# Patient Record
Sex: MALE | Race: WHITE | NOT HISPANIC OR LATINO | Employment: FULL TIME | URBAN - METROPOLITAN AREA
[De-identification: names, ages, dates, MRNs, and addresses within clinical notes are randomized per-mention and may not be internally consistent; named-entity substitution may affect disease eponyms.]

---

## 2018-02-15 ENCOUNTER — TELEPHONE (OUTPATIENT)
Dept: FAMILY MEDICINE | Facility: CLINIC | Age: 53
End: 2018-02-15

## 2018-02-15 NOTE — LETTER
[facility] [address] [place] [address] [phone]      February 15, 2018      Kelvin Guevara  [address]  [facility] [address]          Dear Kelvin,    ACTION REQUIRED    You and I are failing at something critical, screening you for colon cancer!    Colon Cancer Screening- Recommended every 5-10 years, depending on your history, in order to prevent and detect colon cancer at its earliest stages.  Colon cancer is now the second leading cause of death in the [place] for both men and women and there are over 130,000 new cases and 50,000 deaths per year from colon cancer.  Colonoscopies can prevent 90-95% of these deaths.  Problem lesions can be removed before they ever become cancer.  This test is not only looking for cancer, but also getting rid of precancerious lesions.  You are usually given some sedation which makes the test very comfortable for most people.      If you do not wish to do a colonoscopy or cannot afford to do one, and you have no family members with colon cancer and you have had no previous polyps, at this time, there is another option. It is called a Cologuard test (take home stool sample kit).  It does need to be repeated every three years and if a positive result is obtained, you would be referred for a colonoscopy. The FIT test is really easy to do and does not require any  diet or medication restrictions and involves only one collection sample.      If you have completed either one of these tests or had a flexible sigmoidoscopy in the past five years at another facility, please have the records sent to our clinic so that we can best coordinate your care.  Please call us ([phone]) if you have questions or would like arrange either to do a colonoscopy or obtain the necessary test kit for the Cologuard test    Please contact our office to discuss your colon cancer screening options at your next physical exam.    Thanks!    Elvis  Zhou

## 2018-05-31 ENCOUNTER — OFFICE VISIT (OUTPATIENT)
Dept: FAMILY MEDICINE | Facility: CLINIC | Age: 53
End: 2018-05-31

## 2018-05-31 VITALS
DIASTOLIC BLOOD PRESSURE: 84 MMHG | HEIGHT: 63 IN | RESPIRATION RATE: 16 BRPM | OXYGEN SATURATION: 97 % | BODY MASS INDEX: 28.53 KG/M2 | HEART RATE: 67 BPM | TEMPERATURE: 98.1 F | SYSTOLIC BLOOD PRESSURE: 128 MMHG | WEIGHT: 161 LBS

## 2018-05-31 DIAGNOSIS — Z12.5 SCREENING FOR PROSTATE CANCER: ICD-10-CM

## 2018-05-31 DIAGNOSIS — R17 ELEVATED BILIRUBIN: ICD-10-CM

## 2018-05-31 DIAGNOSIS — K64.9 HEMORRHOIDS, UNSPECIFIED HEMORRHOID TYPE: ICD-10-CM

## 2018-05-31 DIAGNOSIS — Z00.00 ROUTINE GENERAL MEDICAL EXAMINATION AT A HEALTH CARE FACILITY: Primary | ICD-10-CM

## 2018-05-31 DIAGNOSIS — Z13.1 SCREENING FOR DIABETES MELLITUS: ICD-10-CM

## 2018-05-31 DIAGNOSIS — Z12.11 SCREEN FOR COLON CANCER: ICD-10-CM

## 2018-05-31 DIAGNOSIS — E78.2 MIXED HYPERLIPIDEMIA: ICD-10-CM

## 2018-05-31 DIAGNOSIS — E83.52 HYPERCALCEMIA: ICD-10-CM

## 2018-05-31 DIAGNOSIS — Z11.4 SCREENING FOR HUMAN IMMUNODEFICIENCY VIRUS: ICD-10-CM

## 2018-05-31 PROBLEM — K64.8 OTHER HEMORRHOIDS: Status: ACTIVE | Noted: 2018-05-31

## 2018-05-31 LAB
% GRANULOCYTES: 71.1 % (ref 42.2–75.2)
HCT VFR BLD AUTO: 44.3 % (ref 39–51)
HEMOGLOBIN: 14.6 G/DL (ref 13.4–17.5)
LYMPHOCYTES NFR BLD AUTO: 22 % (ref 20.5–51.1)
MCH RBC QN AUTO: 29.9 PG (ref 27–31)
MCHC RBC AUTO-ENTMCNC: 32.9 G/DL (ref 33–37)
MCV RBC AUTO: 90.8 FL (ref 80–100)
MONOCYTES NFR BLD AUTO: 6.9 % (ref 1.7–9.3)
PLATELET # BLD AUTO: 237 K/UL (ref 140–450)
RBC # BLD AUTO: 4.87 X10/CMM (ref 4.2–5.9)
WBC # BLD AUTO: 5.5 X10/CMM (ref 3.8–11)

## 2018-05-31 PROCEDURE — 99396 PREV VISIT EST AGE 40-64: CPT | Performed by: FAMILY MEDICINE

## 2018-05-31 PROCEDURE — 80053 COMPREHEN METABOLIC PANEL: CPT | Mod: 90 | Performed by: FAMILY MEDICINE

## 2018-05-31 PROCEDURE — 80061 LIPID PANEL: CPT | Mod: 90 | Performed by: FAMILY MEDICINE

## 2018-05-31 PROCEDURE — 84153 ASSAY OF PSA TOTAL: CPT | Mod: 90 | Performed by: FAMILY MEDICINE

## 2018-05-31 PROCEDURE — 85025 COMPLETE CBC W/AUTO DIFF WBC: CPT | Performed by: FAMILY MEDICINE

## 2018-05-31 PROCEDURE — 36415 COLL VENOUS BLD VENIPUNCTURE: CPT | Performed by: FAMILY MEDICINE

## 2018-05-31 NOTE — MR AVS SNAPSHOT
After Visit Summary   5/31/2018    Kelvin Guevara    MRN: [id number]           Patient Information     Date Of Birth          1965        Visit Information        Provider Department      5/31/2018 8:30 AM Kelly Grady MD [facility]        Today's Diagnoses     Routine general medical examination at a health care facility    -  1    Mixed hyperlipidemia        Hypercalcemia        Hemorrhoids, unspecified hemorrhoid type        Elevated bilirubin        Screening for diabetes mellitus        Screening for human immunodeficiency virus        Screen for colon cancer        Screening for prostate cancer          Care Instructions      Preventive Health Recommendations  Male Ages 50 - 64    Yearly exam:             See your health care provider every year in order to  o   Review health changes.   o   Discuss preventive care.    o   Review your medicines if your doctor has prescribed any.     Have a cholesterol test every 5 years, or more frequently if you are at risk for high cholesterol/heart disease.     Have a diabetes test (fasting glucose) every three years. If you are at risk for diabetes, you should have this test more often.     Have a colonoscopy at age 50, or have a yearly FIT test (stool test). These exams will check for colon cancer.      Talk with your health care provider about whether or not a prostate cancer screening test (PSA) is right for you.    You should be tested each year for STDs (sexually transmitted diseases), if you re at risk.     Shots: Get a flu shot each year. Get a tetanus shot every 10 years.     Nutrition:    Eat at least 5 servings of fruits and vegetables daily.     Eat whole-grain bread, whole-wheat pasta and brown rice instead of white grains and rice.     Talk to your provider about Calcium and Vitamin D.     Lifestyle    Exercise for at least 150 minutes a week (30 minutes a day, 5 days a week). This will help you control your weight  and prevent disease.     Limit alcohol to one drink per day.     No smoking.     Wear sunscreen to prevent skin cancer.     See your dentist every six months for an exam and cleaning.     See your eye doctor every 1 to 2 years.    OK to try generic Zantac (ranitidine) 150 mg up to twice a day if needed for reflux symptoms.  If not helpful, we can consider imaging to look for a hiatal hernia (stomach slides up into the chest).     For your cough, try Zantac twice a day for 1 week - see if it makes a difference.     Please check with your insurance company to verify you have coverage benefits for the two stage shingles vaccination series (Shingrix). Shingles can be a very painful disease and the medications we have are generally not entirely effective at controlling shingles related pain. The vaccination is intended to prevent shingles and to help reduce the risk of having long-term pain if you do contract shingles.     Please check with your insurance company for coverage benefits for Hepatitis A and Hepatitis B vaccination. These vaccinations protect your liver from some forms of hepatitis. Hep A is usually given in 2 vaccinations several weeks apart. Hep B is given in 3 vaccinations over several months.     Consider Ortho for your wrist. Will defer xray today to the Ortho provider.           Follow-ups after your visit        Additional Services     COLORECTAL SURGERY REFERRAL       Your provider has referred you to: N: [facility] Cede[facility] [phone]   [email or web address]    Referral Reason(s): Hemorrhoids  Special Concerns: None  This referral is: Elective (week +)  It is OK to leave a message on patient's voicemail.    Please be aware that coverage of these services is subject to the terms and limitations of your health insurance plan.  Call member services at your health plan with any benefit or coverage questions.      Please bring the following with you to your  appointment:    (1) Any X-Rays, CTs or MRIs which have been performed.  Contact the facility where they were done to arrange for  prior to your scheduled appointment.    (2) List of current medications  (3) This referral request   (4) Any documents/labs given to you for this referral            GASTROENTEROLOGY ADULT REF CONSULT ONLY       Preferred Location: [facility] [phone]  Screening colonoscopy    Please be aware that coverage of these services is subject to the terms and limitations of your health insurance plan.  Call member services at your health plan with any benefit or coverage questions.  Any procedures must be performed at a [facility] facility OR coordinated by your clinic's referral office.    Please bring the following with you to your appointment:    (1) Any X-Rays, CTs or MRIs which have been performed.  Contact the facility where they were done to arrange for  prior to your scheduled appointment.    (2) List of current medications   (3) This referral request   (4) Any documents/labs given to you for this referral            GASTROENTEROLOGY ADULT REF PROCEDURE ONLY       Last Lab Result: Creatinine (mg/dL)       Date                     Value                 06/07/2016               1.05             ----------  There is no height or weight on file to calculate BMI.     Needed:  No  Language:  English    Patient will be contacted to schedule procedure.     Please be aware that coverage of these services is subject to the terms and limitations of your health insurance plan.  Call member services at your health plan with any benefit or coverage questions.  Any procedures must be performed at a [facility] facility OR coordinated by your clinic's referral office.    Please bring the following with you to your appointment:    (1) Any X-Rays, CTs or MRIs which have been performed.  Contact the facility where they were done to arrange for  prior to your scheduled  "appointment.    (2) List of current medications   (3) This referral request   (4) Any documents/labs given to you for this referral                  Who to contact     If you have questions or need follow up information about today's clinic visit or your schedule please contact [facility] directly at [phone].  Normal or non-critical lab and imaging results will be communicated to you by MyChart, letter or phone within 4 business days after the clinic has received the results. If you do not hear from us within 7 days, please contact the clinic through [employer] or phone. If you have a critical or abnormal lab result, we will notify you by phone as soon as possible.  Submit refill requests through [employer] or call your pharmacy and they will forward the refill request to us. Please allow 3 business days for your refill to be completed.          Additional Information About Your Visit        MyChart Information     [employer] lets you send messages to your doctor, view your test results, renew your prescriptions, schedule appointments and more. To sign up, go to [email or web address] . Click on \"Log in\" on the left side of the screen, which will take you to the Welcome page. Then click on \"Sign up Now\" on the right side of the page.     You will be asked to enter the access code listed below, as well as some personal information. Please follow the directions to create your username and password.     Your access code is: [email or web address]  Expires: 2018  9:30 AM     Your access code will  in 90 days. If you need help or a new code, please call your [facility] clinic or [phone].        Care EveryWhere ID     This is your Care EveryWhere ID. This could be used by other organizations to access your [facility] medical records  [id number]        Your Vitals Were     Pulse Temperature Respirations Height Pulse Oximetry BMI (Body Mass Index)    67 98.1  F (36.7  C) (Oral) 16 1.6 m (5' 3\") 97% 28.52 kg/m2 "       Blood Pressure from Last 3 Encounters:   05/31/18 128/84   06/07/16 124/80   04/17/16 (!) 150/98    Weight from Last 3 Encounters:   05/31/18 73 kg (161 lb)   06/07/16 72.1 kg (159 lb)   04/17/16 74.8 kg (165 lb)              We Performed the Following     CBC with Diff/Plt ([facility])     COLORECTAL SURGERY REFERRAL     Comp. Metabolic Panel (14) ([facility])     GASTROENTEROLOGY ADULT REF CONSULT ONLY     GASTROENTEROLOGY ADULT REF PROCEDURE ONLY     HIV 1/0/2 Rflx ([facility])     Lipid Panel ([facility])     PSA Serum ([facility])        Primary Care Provider Office Phone # Fax #    Elvis Epperson [phone] [address]  [facility]note 3[facility] [address]        Equal Access to Services     CODI RIVERO : Hadii aad suzie hadasho Soomaali, waaxda luqadaha, qaybta kaalmada adeegyada, clara nixon haykayli keller . So [facility] [phone].    ATENCIÓN: Si habla español, tiene a woody disposición servicios gratuitos de asistencia lingüística. Maurice al [phone].    We comply with applicable federal civil rights laws and [place] laws. We do not discriminate on the basis of race, color, national origin, age, disability, sex, sexual orientation, or gender identity.            Thank you!     Thank you for choosing [facility]  for your care. Our goal is always to provide you with excellent care. Hearing back from our patients is one way we can continue to improve our services. Please take a few minutes to complete the written survey that you may receive in the mail after your visit with us. Thank you!             Your Updated Medication List - Protect others around you: Learn how to safely use, store and throw away your medicines at www.disposemymeds.org.      Notice  As of 5/31/2018  9:30 AM    You have not been prescribed any medications.

## 2018-05-31 NOTE — PROGRESS NOTES
SUBJECTIVE:   CC: Kelvin Guevara is an 52 year old male who presents for preventative health visit.      Healthy Habits:    Do you get at least three servings of calcium containing foods daily (dairy, green leafy vegetables, etc.)? yes    Amount of exercise or daily activities, outside of work: 5 day(s) per week    Problems taking medications regularly No    Medication side effects: No    Have you had an eye exam in the past two years? yes    Do you see a dentist twice per year? yes    Do you have sleep apnea, excessive snoring or daytime drowsiness?no     CONCERNS: Wrist pain - saw Dr. Epperson about it few years ago, thought it was bone spurs but still bothers him w/ activity - abrhaam while golfing on [address] last week at [employer]. Getting worse. No edema. No N&T into fingers.   Acid reflux or pain in stomach area occasionally, possibly from working out? 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 No known hiatal hernia.    Notices when gets home at 5-6:00 pm, pulls a muscle? Can breathe - feels like acid reflux? Not taken anything for it. Gets it once every 1-2 months. Pain is not burning, does not feel like being poked with anything.   Large hemorrhoids. Present for years. Getting worse. They do not bleed. Has tried pads on them - a little relief but not much. Tries to keep his stool soft. Has not had a colonoscopy yet. Volunteers he knows he is overdue. Is interested in surgical intervention if indicated for very large hemorrhoids.     Today's PHQ-2 Score:   PHQ-2 ( 1999 Pfizer) 5/31/2018 6/7/2016   Q1: Little interest or pleasure in doing things 0 0   Q2: Feeling down, depressed or hopeless 0 0   PHQ-2 Score 0 0     Abuse: Current or Past(Physical, Sexual or Emotional) - No  Do you feel safe in your environment - Yes    Social History   Substance Use Topics     Smoking status: Former Smoker     Types: Cigars     Smokeless tobacco: Never Used      Comment: ocassional     Alcohol use 3.5 oz/week     7  Cans of beer per week      If you drink alcohol do you typically have >3 drinks per day or >7 drinks per week? No                      Last PSA: 0.6 6/7/2016.  Reviewed orders with patient. Reviewed health maintenance and updated orders accordingly - Yes  BP Readings from Last 3 Encounters:   05/31/18 128/84   06/07/16 124/80   04/17/16 (!) 150/98    Wt Readings from Last 3 Encounters:   05/31/18 73 kg (161 lb)   06/07/16 72.1 kg (159 lb)   04/17/16 74.8 kg (165 lb)         Patient Active Problem List   Diagnosis     Mixed hyperlipidemia     Hypercalcemia     Elevated bilirubin     Other hemorrhoids     Past Surgical History:   Procedure Laterality Date     NO HISTORY OF SURGERY         Social History   Substance Use Topics     Smoking status: Former Smoker     Types: Cigars     Smokeless tobacco: Never Used      Comment: ocassional     Alcohol use 3.5 oz/week     7 Cans of beer per week     See Epic - family hx is documented on file and reviewed/today 1 sister and 3 brothers.       No current outpatient prescriptions on file.     Allergies   Allergen Reactions     Amoxicillin Rash     Reviewed and updated as needed this visit by clinical staff  Tobacco  Allergies  Meds  Problems  Med Hx         Reviewed and updated as needed this visit by Provider  Problems  Med Hx        History reviewed. No pertinent past medical history.   Past Surgical History:   Procedure Laterality Date     NO HISTORY OF SURGERY       ROS:  CONSTITUTIONAL: NEGATIVE for fever, chills, change in weight  INTEGUMENTARY/SKIN: NEGATIVE for worrisome rashes, moles or lesions  EYES: NEGATIVE for vision changes or irritation  ENT: NEGATIVE for ear, mouth and throat problems  RESP: NEGATIVE for significant cough or SOB  CV: NEGATIVE for chest pain, palpitations or peripheral edema  GI: NEGATIVE for nausea, abdominal pain, heartburn, or change in bowel habits   male: negative for dysuria, hematuria, decreased urinary stream, erectile  "dysfunction, urethral discharge  MUSCULOSKELETAL: NEGATIVE for significant arthralgias or myalgia other than chronic wrist pain noted above.   NEURO: NEGATIVE for weakness, dizziness or paresthesias  ENDOCRINE: NEGATIVE for temperature intolerance, skin/hair changes  HEME/ALLERGY/IMMUNE: NEGATIVE for bleeding problems  PSYCHIATRIC: NEGATIVE for changes in mood or affect    OBJECTIVE:   /84  Pulse 67  Temp 98.1  F (36.7  C) (Oral)  Resp 16  Ht 1.6 m (5' 3\")  Wt 73 kg (161 lb)  SpO2 97%  BMI 28.52 kg/m2  EXAM:  GENERAL: healthy, alert and no distress; in good spirits. Tanned.   EYES: Eyes grossly normal to inspection, PERRL and conjunctivae and sclerae normal  HENT: ear canals and TM's normal, nose and mouth without ulcers or lesions  NECK: no adenopathy, no asymmetry, masses, or scars and thyroid normal to palpation  RESP: lungs clear to auscultation - no rales, rhonchi or wheezes  CV: regular rate and rhythm, normal S1 S2, no S3 or S4, no murmur, click or rub, no peripheral edema and peripheral pulses strong  ABDOMEN: soft, nontender, no hepatosplenomegaly, no masses and bowel sounds normal   (male): normal male genitalia without lesions or urethral discharge, no hernia  RECTAL: normal sphincter tone, no rectal masses, prostate normal size, smooth, nontender without nodules or masses. Very large cluster external non bleeding hemorrhoids.   MS: no gross musculoskeletal defects noted except mild hypertrophy over L wrist, no edema  SKIN: no suspicious lesions or rashes  NEURO: Normal strength and tone, mentation intact and speech normal  PSYCH: mentation appears normal, affect normal/bright  LYMPH: no cervical, supraclavicular, axillary, or inguinal adenopathy    ASSESSMENT/PLAN:   Kelvin was seen today for physical and consult.    Diagnoses and all orders for this visit:    Routine general medical examination at a health care facility    Mixed hyperlipidemia  -     Comp. Metabolic Panel (14) " "([facility])  -     Lipid Panel ([facility])    Hypercalcemia  -     Comp. Metabolic Panel (14) ([facility])    Hemorrhoids, unspecified hemorrhoid type  -     COLORECTAL SURGERY REFERRAL   Discussed topical tx and taking sitz baths s/p BM   Discussed keeping stools soft to avoid straining    Elevated bilirubin  -     Comp. Metabolic Panel (14) ([facility])  -     CBC with Diff/Plt ([facility])    Screening for diabetes mellitus  -     Comp. Metabolic Panel (14) ([facility])    Screening for human immunodeficiency virus  -     HIV 1/0/2 Rflx ([facility])    Screen for colon cancer  -     GASTROENTEROLOGY ADULT REF CONSULT ONLY  -     GASTROENTEROLOGY ADULT REF PROCEDURE ONLY    Screening for prostate cancer  -     PSA Serum ([facility])    Wrist pain - discuss obtaining a film today vs. Seeing Ortho. We opt that he will see Ortho who may request speciality films of his wrist.     COUNSELING:  Reviewed preventive health counseling, as reflected in patient instructions       Regular exercise       Healthy diet/nutrition       HIV screeninx in teen years, 1x in adult years, and at intervals if high risk       Colon cancer screening       Prostate cancer screening    BP Screening:   Last 3 BP Readings:    BP Readings from Last 3 Encounters:   18 128/84   16 124/80   16 (!) 150/98       The following was recommended to the patient:  Re-screen BP within a year and recommended lifestyle modifications   reports that he has quit smoking. His smoking use included Cigars. He has never used smokeless tobacco.    Estimated body mass index is 28.52 kg/(m^2) as calculated from the following:    Height as of this encounter: 1.6 m (5' 3\").    Weight as of this encounter: 73 kg (161 lb).   Weight management plan: Discussed healthy diet and exercise guidelines and patient will follow up in 1 yr in clinic to re-evaluate.    AVS Instructions  OK to try generic Zantac (ranitidine) 150 mg up to twice a day if needed for reflux symptoms.  If not " helpful, we can consider imaging to look for a hiatal hernia (stomach slides up into the chest).     For your cough, try Zantac twice a day for 1 week - see if it makes a difference.     Please check with your insurance company to verify you have coverage benefits for the two stage shingles vaccination series (Shingrix). Shingles can be a very painful disease and the medications we have are generally not entirely effective at controlling shingles related pain. The vaccination is intended to prevent shingles and to help reduce the risk of having long-term pain if you do contract shingles.     Please check with your insurance company for coverage benefits for Hepatitis A and Hepatitis B vaccination. These vaccinations protect your liver from some forms of hepatitis. Hep A is usually given in 2 vaccinations several weeks apart. Hep B is given in 3 vaccinations over several months.     Consider Ortho for your wrist. Will defer xray today to the Ortho provider.     Counseling Resources:  ATP IV Guidelines  Pooled Cohorts Equation Calculator  FRAX Risk Assessment  ICSI Preventive Guidelines  Dietary Guidelines for Americans, 2010  USDA's MyPlate  ASA Prophylaxis  Lung CA Screening    Kelly Grady MD  [facility]

## 2018-05-31 NOTE — PATIENT INSTRUCTIONS
Preventive Health Recommendations  Male Ages 50 - 64    Yearly exam:             See your health care provider every year in order to  o   Review health changes.   o   Discuss preventive care.    o   Review your medicines if your doctor has prescribed any.     Have a cholesterol test every 5 years, or more frequently if you are at risk for high cholesterol/heart disease.     Have a diabetes test (fasting glucose) every three years. If you are at risk for diabetes, you should have this test more often.     Have a colonoscopy at age 50, or have a yearly FIT test (stool test). These exams will check for colon cancer.      Talk with your health care provider about whether or not a prostate cancer screening test (PSA) is right for you.    You should be tested each year for STDs (sexually transmitted diseases), if you re at risk.     Shots: Get a flu shot each year. Get a tetanus shot every 10 years.     Nutrition:    Eat at least 5 servings of fruits and vegetables daily.     Eat whole-grain bread, whole-wheat pasta and brown rice instead of white grains and rice.     Talk to your provider about Calcium and Vitamin D.     Lifestyle    Exercise for at least 150 minutes a week (30 minutes a day, 5 days a week). This will help you control your weight and prevent disease.     Limit alcohol to one drink per day.     No smoking.     Wear sunscreen to prevent skin cancer.     See your dentist every six months for an exam and cleaning.     See your eye doctor every 1 to 2 years.    OK to try generic Zantac (ranitidine) 150 mg up to twice a day if needed for reflux symptoms.  If not helpful, we can consider imaging to look for a hiatal hernia (stomach slides up into the chest).     For your cough, try Zantac twice a day for 1 week - see if it makes a difference.     Please check with your insurance company to verify you have coverage benefits for the two stage shingles vaccination series (Shingrix). Shingles can be a very  painful disease and the medications we have are generally not entirely effective at controlling shingles related pain. The vaccination is intended to prevent shingles and to help reduce the risk of having long-term pain if you do contract shingles.     Please check with your insurance company for coverage benefits for Hepatitis A and Hepatitis B vaccination. These vaccinations protect your liver from some forms of hepatitis. Hep A is usually given in 2 vaccinations several weeks apart. Hep B is given in 3 vaccinations over several months.     Consider Ortho for your wrist. Will defer xray today to the Ortho provider.

## 2018-05-31 NOTE — LETTER
"[facility]  [address] [place]  [address]  Phone: [phone]    June 5, 2018      Kelvin Guevara  [address]  [facility] [address]              Dear Kelvin,    The results from your recent visit showed that your fasting glucose remains a little high - please continue to try to reduce the amount of carbohydrates and sweets in your diet. Staying physically active will also help burn off extra blood sugar.   Your kidney labs all look fine (I am not too concerned about the minimally elevated CO2 level).   Your total bilirubin continues to be elevated - in light of the rest of your liver labs and blood counts all within a normal range, the elevated total bilirubin likely represents a common and benign condition called Gilbert's Disease and is not of great concern.     However, because of your report of abdominal discomfort when you bend over, let's get an ultrasound of your liver to verify everything looks good. You may call my office to schedule this test at [facility] [place].     Your cholesterol remains a little high, but is much improved since we it was checked last year. The high triglycerides are likely related to your slightly high blood sugar. According to the American Heart Association, your 10 year risk of having a heart attack or stroke is 3.9%. This is below the cutoff of 5%, the level we consider starting a daily cholesterol lowering medication. Good job!   Your screening test for HIV is negative as we expected.         Sincerely,     Kelly \"Lissa\" MD Miguel A    Results for orders placed or performed in visit on 05/31/18   Comp. Metabolic Panel (14) ([facility])   Result Value Ref Range    Glucose 103 (H) 65 - 99 mg/dL    Urea Nitrogen 14 6 - 24 mg/dL    Creatinine 0.88 0.76 - 1.27 mg/dL    eGFR If NonAfricn Am 98 >59 mL/min/1.73    eGFR If Africn Am 113 >59 mL/min/1.73    BUN/Creatinine Ratio 16 9 - 20    Sodium 143 134 - 144 mmol/L    Potassium 4.3 3.5 - 5.2 mmol/L    " Chloride 103 96 - 106 mmol/L    Total CO2 30 (H) 18 - 28 mmol/L    Calcium 9.8 8.7 - 10.2 mg/dL    Protein Total 7.3 6.0 - 8.5 g/dL    Albumin 4.5 3.5 - 5.5 g/dL    Globulin, Total 2.8 1.5 - 4.5 g/dL    A/G Ratio 1.6 1.2 - 2.2    Bilirubin Total 2.2 (H) 0.0 - 1.2 mg/dL    Alkaline Phosphatase 52 39 - 117 IU/L    AST 30 0 - 40 IU/L    ALT 27 0 - 44 IU/L    Narrative    Performed at:  01 - [facility] [address], [place], [place]  [id number]  : Ciro Cheema MD, Phone:  [phone]   Lipid Panel ([facility])   Result Value Ref Range    Cholesterol 237 (H) 100 - 199 mg/dL    Triglycerides 165 (H) 0 - 149 mg/dL    HDL Cholesterol 75 >39 mg/dL    VLDL Cholesterol Mauricio 33 5 - 40 mg/dL    LDL Cholesterol Calculated 129 (H) 0 - 99 mg/dL    LDL/HDL Ratio 1.7 0.0 - 3.6 ratio    Narrative    Performed at:  01 - [facility] [address], [place], [place]  [id number]  : Ciro Cheema MD, Phone:  [phone]   CBC with Diff/Plt ([facility])   Result Value Ref Range    WBC x10/cmm 5.5 3.8 - 11.0 x10/cmm    % Lymphocytes 22.0 20.5 - 51.1 %    % Monocytes 6.9 1.7 - 9.3 %    % Granulocytes 71.1 42.2 - 75.2 %    RBC x10/cmm 4.87 4.2 - 5.9 x10/cmm    Hemoglobin 14.6 13.4 - 17.5 g/dl    Hematocrit 44.3 39 - 51 %    MCV 90.8 80 - 100 fL    MCH 29.9 27.0 - 31.0 pg    MCHC 32.9 (A) 33.0 - 37.0 g/dL    Platelet Count 237 140 - 450 K/uL   PSA Serum ([facility])   Result Value Ref Range    PSA NG/ML 0.5 0.0 - 4.0 ng/mL    Narrative    Performed at:  01 - [facility] [address], [place], [place]  [id number]  : Ciro Cheema MD, Phone:  [phone]   HIV 1/0/2 Rflx ([facility])   Result Value Ref Range    HIV Screen 4th Gen with Rflx Non Reactive Non Reactive    Narrative    Performed at:  01 - [facility]  [address], [place]  [id number]  : Ciro Cheema MD, Phone:  [phone]

## 2018-06-02 LAB
ALBUMIN SERPL-MCNC: 4.5 G/DL (ref 3.5–5.5)
ALBUMIN/GLOB SERPL: 1.6 {RATIO} (ref 1.2–2.2)
ALP SERPL-CCNC: 52 IU/L (ref 39–117)
ALT SERPL-CCNC: 27 IU/L (ref 0–44)
AST SERPL-CCNC: 30 IU/L (ref 0–40)
BILIRUB SERPL-MCNC: 2.2 MG/DL (ref 0–1.2)
BUN SERPL-MCNC: 14 MG/DL (ref 6–24)
BUN/CREATININE RATIO: 16 (ref 9–20)
CALCIUM SERPL-MCNC: 9.8 MG/DL (ref 8.7–10.2)
CHLORIDE SERPLBLD-SCNC: 103 MMOL/L (ref 96–106)
CHOLEST SERPL-MCNC: 237 MG/DL (ref 100–199)
CREAT SERPL-MCNC: 0.88 MG/DL (ref 0.76–1.27)
EGFR IF AFRICN AM: 113 ML/MIN/1.73
EGFR IF NONAFRICN AM: 98 ML/MIN/1.73
GLOBULIN, TOTAL: 2.8 G/DL (ref 1.5–4.5)
GLUCOSE SERPL-MCNC: 103 MG/DL (ref 65–99)
HDLC SERPL-MCNC: 75 MG/DL
HIV SCREEN 4TH GEN WITH RFLX: NON REACTIVE
LDL/HDL RATIO: 1.7 RATIO (ref 0–3.6)
LDLC SERPL CALC-MCNC: 129 MG/DL (ref 0–99)
POTASSIUM SERPL-SCNC: 4.3 MMOL/L (ref 3.5–5.2)
PROT SERPL-MCNC: 7.3 G/DL (ref 6–8.5)
PSA NG/ML: 0.5 NG/ML (ref 0–4)
SODIUM SERPL-SCNC: 143 MMOL/L (ref 134–144)
TOTAL CO2: 30 MMOL/L (ref 18–28)
TRIGL SERPL-MCNC: 165 MG/DL (ref 0–149)
VLDLC SERPL CALC-MCNC: 33 MG/DL (ref 5–40)

## 2018-06-05 DIAGNOSIS — R10.13 ABDOMINAL DISCOMFORT, EPIGASTRIC: ICD-10-CM

## 2018-06-05 DIAGNOSIS — R17 ELEVATED BILIRUBIN: Primary | ICD-10-CM

## 2018-06-05 NOTE — PROGRESS NOTES
See my result note for annual physical labs.   I ordered liver US for pt.   Strongly suspect Gilbert Disease.

## 2023-05-13 ENCOUNTER — OFFICE VISIT (OUTPATIENT)
Dept: URGENT CARE | Facility: URGENT CARE | Age: 58
End: 2023-05-13
Payer: COMMERCIAL

## 2023-05-13 VITALS
HEART RATE: 64 BPM | RESPIRATION RATE: 20 BRPM | OXYGEN SATURATION: 100 % | BODY MASS INDEX: 28.59 KG/M2 | SYSTOLIC BLOOD PRESSURE: 166 MMHG | TEMPERATURE: 98.6 F | DIASTOLIC BLOOD PRESSURE: 83 MMHG | WEIGHT: 161.4 LBS

## 2023-05-13 DIAGNOSIS — B02.9 HERPES ZOSTER WITHOUT COMPLICATION: Primary | ICD-10-CM

## 2023-05-13 PROCEDURE — 99203 OFFICE O/P NEW LOW 30 MIN: CPT | Performed by: INTERNAL MEDICINE

## 2023-05-13 RX ORDER — VALACYCLOVIR HYDROCHLORIDE 1 G/1
1000 TABLET, FILM COATED ORAL 3 TIMES DAILY
Qty: 21 TABLET | Refills: 0 | Status: SHIPPED | OUTPATIENT
Start: 2023-05-13 | End: 2024-05-03

## 2023-05-13 NOTE — PROGRESS NOTES
Assessment & Plan     Herpes zoster without complication  - Adult Eye  Referral; Future  - valACYclovir (VALTREX) 1000 mg tablet; Take 1 tablet (1,000 mg) by mouth 3 times daily for 7 days    Kishor Padron MD  [facility] CARE UMU SCANLON is a 57 year old, presenting for the following health issues:  Rash (Rash on the face and head for the last three days. )         View : No data to display.              HPI   Noting a rash and tingling of the scalp on the right forehead.  This is above the eye. Clusters of blisters that now have some hemorrhagic crust.  Not more than three days duration.  Vision does not seem to be affected.    Review of Systems   ROS:  The following systems have been completely reviewed and are negative except as noted in the HPI: CONSTITUTIONAL, EYE, HEAD AND NECK and DERMATOLOGIC       Objective    BP (!) 166/83 (BP Location: Right arm, Patient Position: Sitting, Cuff Size: Adult Large)   Pulse 64   Temp 98.6  F (37  C) (Tympanic)   Resp 20   Wt 73.2 kg (161 lb 6.4 oz)   SpO2 100%   BMI 28.59 kg/m    Body mass index is 28.59 kg/m .  Physical Exam   GENERAL APPEARANCE: healthy, alert and no distress  EYES: mild conjunctival irritation of the right eye without drainage or discharge; the eye is topically anesthetized with proparacaine and then fluorescein dye is instilled; under UV light, the corneal epithelium is intact; the anterior chamber appears clear  SKIN: clusters of papulovesicular lesions, some with hemorrhagic crust, are scattered over the right forehead and anterior scalp in the V1 distribution of the right trigeminal nerve.

## 2023-05-15 ENCOUNTER — TELEPHONE (OUTPATIENT)
Dept: OPTOMETRY | Facility: CLINIC | Age: 58
End: 2023-05-15
Payer: COMMERCIAL

## 2023-05-15 NOTE — TELEPHONE ENCOUNTER
[facility] Call Center    Phone Message    May a detailed message be left on voicemail: yes     Reason for Call: Appointment Intake    Referring Provider Name: Kishor Padron MD  Diagnosis and/or Symptoms: Herpes zoster in right trigeminal nerve distribution; evaluate for zoster ophthalmicus    Sending TE per protocols. Preferred location is [place] and pt only has today off of work. Thank you.    Action Taken: Message routed to:  Other: [facility]    Travel Screening: Not Applicable

## 2023-05-15 NOTE — CONFIDENTIAL NOTE
Talked with patient. I told him that we are booked today. I offered appointments this week but patient declined because of work schedule. Patient is currently taking Valtrex. Started 2 days ago. No current ocular symptoms. I told him to call us if ocular symptoms should arise and we will get him in to see one of our Doctors.

## 2023-07-14 ENCOUNTER — OFFICE VISIT (OUTPATIENT)
Dept: URGENT CARE | Facility: URGENT CARE | Age: 58
End: 2023-07-14
Payer: COMMERCIAL

## 2023-07-14 VITALS
DIASTOLIC BLOOD PRESSURE: 80 MMHG | RESPIRATION RATE: 16 BRPM | TEMPERATURE: 96.8 F | WEIGHT: 161 LBS | OXYGEN SATURATION: 99 % | SYSTOLIC BLOOD PRESSURE: 142 MMHG | HEART RATE: 60 BPM | BODY MASS INDEX: 28.52 KG/M2

## 2023-07-14 DIAGNOSIS — M79.672 LEFT FOOT PAIN: Primary | ICD-10-CM

## 2023-07-14 DIAGNOSIS — M10.072 ACUTE IDIOPATHIC GOUT OF LEFT FOOT: ICD-10-CM

## 2023-07-14 LAB
ERYTHROCYTE [SEDIMENTATION RATE] IN BLOOD BY WESTERGREN METHOD: 5 MM/HR (ref 0–20)
URATE SERPL-MCNC: 6.8 MG/DL (ref 3.4–7)
WBC # BLD AUTO: 8.9 10E3/UL (ref 4–11)

## 2023-07-14 PROCEDURE — 36415 COLL VENOUS BLD VENIPUNCTURE: CPT | Performed by: PHYSICIAN ASSISTANT

## 2023-07-14 PROCEDURE — 85652 RBC SED RATE AUTOMATED: CPT | Performed by: PHYSICIAN ASSISTANT

## 2023-07-14 PROCEDURE — 85048 AUTOMATED LEUKOCYTE COUNT: CPT | Performed by: PHYSICIAN ASSISTANT

## 2023-07-14 PROCEDURE — 84550 ASSAY OF BLOOD/URIC ACID: CPT | Performed by: PHYSICIAN ASSISTANT

## 2023-07-14 PROCEDURE — 99214 OFFICE O/P EST MOD 30 MIN: CPT | Performed by: PHYSICIAN ASSISTANT

## 2023-07-14 RX ORDER — METHYLPREDNISOLONE 4 MG
TABLET, DOSE PACK ORAL
Qty: 21 TABLET | Refills: 0 | Status: SHIPPED | OUTPATIENT
Start: 2023-07-14 | End: 2024-05-03

## 2023-07-14 NOTE — PROGRESS NOTES
Assessment & Plan     Left foot pain    Left foot pain secondary to gout  Rest, elevation  Trial course of prednisone    - methylPREDNISolone (MEDROL DOSEPAK) 4 MG tablet therapy pack; Follow package instructions    Acute idiopathic gout of left foot    Gout is an inflammation of a joint caused by an inflammatory response to gout crystals in the joint fluid. This occurs when there is excess uric acid. Uric acid is a normal waste product in the body. It builds up in the body when the kidneys can't filter enough of it from the blood. This may occur with age. It's also associated with kidney disease. Gout occurs more often in people with obesity, diabetes, high blood pressure, or high levels of fats in the blood. It may run in families. Gout tends to come and go. A flare up of gout is called an attack. Drinking alcohol or eating certain foods (such as shellfish or foods with additives such as high-fructose corn syrup) may increase uric acid levels in the blood and cause a gout attack.     - WBC count; Future  - Uric acid; Future  - ESR: Erythrocyte sedimentation rate; Future  - methylPREDNISolone (MEDROL DOSEPAK) 4 MG tablet therapy pack; Follow package instructions    Review of external notes as documented elsewhere in note       At today's visit with Kelvin Guevara , we discussed results, diagnosis, medications and formulated a plan.  We also discussed red flags for immediate return to clinic/ER, as well as indications for follow up with PCP if not improved in 3 days. Patient understood and agreed to plan. Kelvin Guevara was discharged with stable vitals and has no further questions.       No follow-ups on file.    Khris Rooney, [facility], PA-C  [facility] UMU SCANLON is a 57 year old, presenting for the following health issues:  Foot Pain (Left foot pain, redness, and swelling X 5 days that got worse last night )         No data to display              HPI   Review of Systems    Constitutional, HEENT, cardiovascular, pulmonary, gi and gu systems are negative, except as otherwise noted.      Objective    BP (!) 142/80   Pulse 60   Temp 96.8  F (36  C) (Tympanic)   Resp 16   Wt 73 kg (161 lb)   SpO2 99%   BMI 28.52 kg/m    Body mass index is 28.52 kg/m .  Physical Exam   GENERAL: healthy, alert and no distress  MS: Positive for left foot tenderness, swelling  SKIN: Positive for left foot swelling, erythema  NEURO: Normal strength and tone, mentation intact and speech normal  PSYCH: mentation appears normal, affect normal/bright    Results for orders placed or performed in visit on 07/14/23   WBC count     Status: Normal   Result Value Ref Range    WBC Count 8.9 4.0 - 11.0 10e3/uL   ESR: Erythrocyte sedimentation rate     Status: Normal   Result Value Ref Range    Erythrocyte Sedimentation Rate 5 0 - 20 mm/hr

## 2024-05-03 ENCOUNTER — OFFICE VISIT (OUTPATIENT)
Dept: FAMILY MEDICINE | Facility: CLINIC | Age: 59
End: 2024-05-03

## 2024-05-03 VITALS
SYSTOLIC BLOOD PRESSURE: 139 MMHG | OXYGEN SATURATION: 99 % | WEIGHT: 159 LBS | HEART RATE: 62 BPM | BODY MASS INDEX: 28.17 KG/M2 | DIASTOLIC BLOOD PRESSURE: 91 MMHG

## 2024-05-03 DIAGNOSIS — M10.9 ACUTE GOUT OF RIGHT FOOT, UNSPECIFIED CAUSE: Primary | ICD-10-CM

## 2024-05-03 DIAGNOSIS — M79.671 RIGHT FOOT PAIN: ICD-10-CM

## 2024-05-03 PROCEDURE — 99213 OFFICE O/P EST LOW 20 MIN: CPT

## 2024-05-03 PROCEDURE — 73600 X-RAY EXAM OF ANKLE: CPT | Mod: RT

## 2024-05-03 RX ORDER — INDOMETHACIN 50 MG/1
50 CAPSULE ORAL 2 TIMES DAILY WITH MEALS
Qty: 14 CAPSULE | Refills: 0 | Status: SHIPPED | OUTPATIENT
Start: 2024-05-03 | End: 2024-06-28

## 2024-05-03 NOTE — PATIENT INSTRUCTIONS
Omron blood pressure cuff    How to take home blood pressure:  Sit for 5 minutes with feet flat on the floor.  Apply cuff to upper arm (bicep area) and have this arm resting at about heart level.  Take blood pressure reading.  Make sure that the machine has a memory bank that records your readings or write readings down  If you remain elevated, greater than 140/90 then please return to clinic for further high blood pressure work up.

## 2024-05-03 NOTE — PROGRESS NOTES
Assessment & Plan     Right foot pain  - do not see anything worrisome on xray, await radiology read   - XR Foot Right G/E 3 Views    Acute gout of right foot, unspecified cause  - already took prednisone, will prescribe indomethacin to help with swelling, ice, and rest foot along with elevation to help with swelling  - Red flags that warrant emergent evaluation discussed  -Patient verbalized understanding and is agreeable to the discussed plan of care.  - Education about adverse effects of prescription medication discussed    - indomethacin (INDOCIN) 50 MG capsule  Dispense: 14 capsule; Refill: 0      See Patient Instructions    Return in about 5 months (around 10/3/2024), or if symptoms worsen or fail to improve, for Follow up.    Cierra SCANLON is a 58 year old, presenting for the following health issues:  Musculoskeletal Problem (Was dealing with gout pain and swelling, resolved a few days ago. Was having pain for about 2 weeks, which is longer than usual./Possible wart on the bottom of foot.)    HPI     1.) Gout flare - was seen virtually a little over 2 weeks ago and given 5 days of prednisone  - swelling remains present, pain worse in the morning  Has only had 2 flares, 1st one last fall and this one.     Also bottom of foot along the ball inferior to 1-2 toes has a spot that if steps on something hard this causes pain for him - has been present for 1 year     Review of Systems  Constitutional, HEENT, cardiovascular, pulmonary, gi and gu systems are negative, except as otherwise noted.      Objective    BP (!) 139/91   Pulse 62   Wt 72.1 kg (159 lb)   SpO2 99%   BMI 28.17 kg/m    Body mass index is 28.17 kg/m .  Physical Exam   GENERAL: alert and no distress  RESP: unlabored breathing, equal chest rise   MS: mild edema, peripheral pulses normal, and tenderness to palpation posterior ball of foot and along metatarsal toe line   SKIN: no suspicious lesions or rashes  NEURO: Normal strength and tone,  mentation intact and speech normal  PSYCH: mentation appears normal, affect normal/bright    No results found for this or any previous visit (from the past 24 hour(s)).        Signed Electronically by: DAR Oliveira CNP

## 2024-05-24 ENCOUNTER — OFFICE VISIT (OUTPATIENT)
Dept: FAMILY MEDICINE | Facility: CLINIC | Age: 59
End: 2024-05-24

## 2024-05-24 VITALS
WEIGHT: 160 LBS | OXYGEN SATURATION: 98 % | DIASTOLIC BLOOD PRESSURE: 93 MMHG | HEIGHT: 63 IN | SYSTOLIC BLOOD PRESSURE: 132 MMHG | HEART RATE: 62 BPM | BODY MASS INDEX: 28.35 KG/M2

## 2024-05-24 DIAGNOSIS — R03.0 ELEVATED BP WITHOUT DIAGNOSIS OF HYPERTENSION: ICD-10-CM

## 2024-05-24 DIAGNOSIS — M10.9 ACUTE GOUT OF LEFT FOOT, UNSPECIFIED CAUSE: Primary | ICD-10-CM

## 2024-05-24 PROCEDURE — 99213 OFFICE O/P EST LOW 20 MIN: CPT | Performed by: FAMILY MEDICINE

## 2024-05-24 PROCEDURE — G2211 COMPLEX E/M VISIT ADD ON: HCPCS | Performed by: FAMILY MEDICINE

## 2024-05-24 RX ORDER — METHYLPREDNISOLONE 4 MG
TABLET, DOSE PACK ORAL
Qty: 21 TABLET | Refills: 0 | Status: SHIPPED | OUTPATIENT
Start: 2024-05-24 | End: 2024-06-28

## 2024-05-24 NOTE — PROGRESS NOTES
"SUBJECTIVE:    Kelvin Guevara, is a 58 year old male presenting for the below:     1. 1 day sudden onset of redness, tenderness, heat left 3rd MTP joint. Very reminiscent to patient of prior gout flares. Denies trauma to foot.  Feeling systemically well otherwise.     OBJECTIVE:  Vitals:    05/24/24 1450 05/24/24 1513   BP: (!) 140/100 (!) 132/93   Pulse: 62    SpO2: 98%    Weight: 72.6 kg (160 lb)    Height: 1.594 m (5' 2.75\")     Body mass index is 28.57 kg/m .  General: no acute distress, cooperative with exam.  Extremities: left foot with mild erythema and heat overlying 3rd MTP joint.     Uric Acid   Date Value Ref Range Status   07/14/2023 6.8 3.4 - 7.0 mg/dL Final     ASSESSMENT / PLAN:      Acute gout of left foot, unspecified cause  3rd flare in around 12 months.   Patient interested in starting daily prophylactic allopurinol:   -recheck uric acid level once this flare settled  -allopurinol start once current flare settled.   -     methylPREDNISolone (MEDROL DOSEPAK) 4 MG tablet therapy pack; Follow Package Directions    Elevated BP without diagnosis of hypertension  Will bring in home blood pressure readings to upcoming annual physical.   Would avoid thiazide diuretic with h/o gout if antihypertensive medication start indicated    Follow up:  To schedule annual physical    "

## 2024-06-02 ENCOUNTER — HEALTH MAINTENANCE LETTER (OUTPATIENT)
Age: 59
End: 2024-06-02

## 2024-06-25 SDOH — HEALTH STABILITY: PHYSICAL HEALTH: ON AVERAGE, HOW MANY MINUTES DO YOU ENGAGE IN EXERCISE AT THIS LEVEL?: 60 MIN

## 2024-06-25 SDOH — HEALTH STABILITY: PHYSICAL HEALTH: ON AVERAGE, HOW MANY DAYS PER WEEK DO YOU ENGAGE IN MODERATE TO STRENUOUS EXERCISE (LIKE A BRISK WALK)?: 5 DAYS

## 2024-06-25 ASSESSMENT — SOCIAL DETERMINANTS OF HEALTH (SDOH): HOW OFTEN DO YOU GET TOGETHER WITH FRIENDS OR RELATIVES?: TWICE A WEEK

## 2024-06-28 ENCOUNTER — OFFICE VISIT (OUTPATIENT)
Dept: FAMILY MEDICINE | Facility: CLINIC | Age: 59
End: 2024-06-28

## 2024-06-28 VITALS
DIASTOLIC BLOOD PRESSURE: 90 MMHG | OXYGEN SATURATION: 96 % | HEART RATE: 66 BPM | SYSTOLIC BLOOD PRESSURE: 136 MMHG | BODY MASS INDEX: 28.75 KG/M2 | WEIGHT: 161 LBS

## 2024-06-28 DIAGNOSIS — Z12.11 SCREENING FOR MALIGNANT NEOPLASM OF COLON: ICD-10-CM

## 2024-06-28 DIAGNOSIS — Z23 NEED FOR VACCINATION: ICD-10-CM

## 2024-06-28 DIAGNOSIS — I10 ESSENTIAL HYPERTENSION: ICD-10-CM

## 2024-06-28 DIAGNOSIS — M1A.00X0 IDIOPATHIC CHRONIC GOUT WITHOUT TOPHUS, UNSPECIFIED SITE: ICD-10-CM

## 2024-06-28 DIAGNOSIS — Z00.00 ROUTINE GENERAL MEDICAL EXAMINATION AT A HEALTH CARE FACILITY: Primary | ICD-10-CM

## 2024-06-28 DIAGNOSIS — Z23 COVID-19 VACCINE ADMINISTERED: ICD-10-CM

## 2024-06-28 DIAGNOSIS — E78.2 MIXED HYPERLIPIDEMIA: ICD-10-CM

## 2024-06-28 LAB
ANION GAP SERPL CALCULATED.3IONS-SCNC: 12 MMOL/L (ref 7–15)
BUN SERPL-MCNC: 14.9 MG/DL (ref 8–23)
CALCIUM SERPL-MCNC: 10.1 MG/DL (ref 8.6–10)
CHLORIDE SERPL-SCNC: 102 MMOL/L (ref 98–107)
CHOLESTEROL: 247 MG/DL (ref 100–199)
CREAT SERPL-MCNC: 0.92 MG/DL (ref 0.67–1.17)
DEPRECATED HCO3 PLAS-SCNC: 25 MMOL/L (ref 22–29)
EGFRCR SERPLBLD CKD-EPI 2021: >90 ML/MIN/1.73M2
FASTING STATUS PATIENT QL REPORTED: NO
FASTING?: NO
GLUCOSE SERPL-MCNC: 90 MG/DL (ref 70–99)
HDL (RMG): 67 MG/DL (ref 40–?)
LDL CALCULATED (RMG): 167 MG/DL (ref 0–130)
POTASSIUM SERPL-SCNC: 4.2 MMOL/L (ref 3.4–5.3)
SODIUM SERPL-SCNC: 139 MMOL/L (ref 135–145)
TRIGLYCERIDES (RMG): 66 MG/DL (ref 0–149)
URATE SERPL-MCNC: 6.6 MG/DL (ref 3.4–7)

## 2024-06-28 PROCEDURE — 91322 SARSCOV2 VAC 50 MCG/0.5ML IM: CPT

## 2024-06-28 PROCEDURE — 90750 HZV VACC RECOMBINANT IM: CPT

## 2024-06-28 PROCEDURE — 36415 COLL VENOUS BLD VENIPUNCTURE: CPT

## 2024-06-28 PROCEDURE — 80048 BASIC METABOLIC PNL TOTAL CA: CPT | Mod: ORL

## 2024-06-28 PROCEDURE — 99396 PREV VISIT EST AGE 40-64: CPT | Mod: 25

## 2024-06-28 PROCEDURE — 90715 TDAP VACCINE 7 YRS/> IM: CPT

## 2024-06-28 PROCEDURE — 84550 ASSAY OF BLOOD/URIC ACID: CPT | Mod: ORL

## 2024-06-28 PROCEDURE — 80061 LIPID PANEL: CPT | Mod: QW

## 2024-06-28 PROCEDURE — 90471 IMMUNIZATION ADMIN: CPT

## 2024-06-28 PROCEDURE — 90480 ADMN SARSCOV2 VAC 1/ONLY CMP: CPT

## 2024-06-28 PROCEDURE — 90472 IMMUNIZATION ADMIN EACH ADD: CPT

## 2024-06-28 RX ORDER — LISINOPRIL 10 MG/1
10 TABLET ORAL DAILY
Qty: 30 TABLET | Refills: 1 | Status: SHIPPED | OUTPATIENT
Start: 2024-06-28 | End: 2024-07-12

## 2024-06-28 RX ORDER — ALLOPURINOL 100 MG/1
100 TABLET ORAL DAILY
Qty: 30 TABLET | Refills: 0 | Status: SHIPPED | OUTPATIENT
Start: 2024-06-28 | End: 2024-07-12

## 2024-06-28 NOTE — PROGRESS NOTES
Preventive Care Visit  [facility]  DAR Mccormick CNP, Family Medicine  Jun 28, 2024      Assessment & Plan     Routine general medical examination at a health care facility  Age-appropriate preventative health maintenance along with diet, exercise and healthy weight discussed.     Essential hypertension  Blood pressures have been elevated. Not on medications. Goal BP <140/90. Will start Lisinopril 10 mg daily Reviewed lifestyle modifications. Required intervals for follow up on HTN, lab studies reviewed. Strongly recommened blood pressures are checked outside the clinic to ensure that BPs are remaining within guidelines. Instructed to contact me if the readings are not within guidelines on a regular basis so we can adjust treatment as needed. Reviewed side effects of medications, alarm signs and symptoms, and when to seek further care. Recommend follow up in 2-4 weeks.   - VENOUS COLLECTION  - lisinopril (ZESTRIL) 10 MG tablet  Dispense: 30 tablet; Refill: 1  - Basic metabolic panel    Idiopathic chronic gout without tophus, unspecified site  Patient reports 3 gout flares in the past 12 months. We discussed the diagnosis, pathophysiology and natural history related to gout. Patient interested in starting daily prophylactic allopurinol. Rx for Allopurinol. Education about adverse effects of prescription medication discussed. Recommend taking with food. Reviewed low purine diet. Will recheck uric acid level today as recent flare subsided. Follow up in 2-4 weeks or sooner as needed for new or worsening symptoms. Red flags that warrant emergent evaluation discussed. Patient in agreement with plan. All questions answered.   - VENOUS COLLECTION  - Uric acid  - allopurinol (ZYLOPRIM) 100 MG tablet  Dispense: 30 tablet; Refill: 0    Mixed hyperlipidemia  Will recheck today.   - Lipid Profile ([facility])  - VENOUS COLLECTION    Need for vaccination  - TDAP 7+ (ADACEL,BOOSTRIX)  - VACCINE ADMINISTRATION,  "INITIAL  - ZOSTER RECOMBINANT ADJUVANTED (SHINGRIX)  - IMMUNIATION ADMIN EACH ADDT'    COVID-19 vaccine administered  - [place] ADMIN COVID VACCINE, IM  - COVID-19 12+ (2023-24) (MODERNA)    Screening for malignant neoplasm of colon  - Colonoscopy Screening  Referral        Patient has been advised of split billing requirements and indicates understanding: Yes        BMI  Estimated body mass index is 28.75 kg/m  as calculated from the following:    Height as of 5/24/24: 1.594 m (5' 2.75\").    Weight as of this encounter: 73 kg (161 lb).   Weight management plan: Discussed healthy diet and exercise guidelines    Counseling  Appropriate preventive services were discussed with this patient, including applicable screening as appropriate for fall prevention, nutrition, physical activity, Tobacco-use cessation, weight loss and cognition.  Checklist reviewing preventive services available has been given to the patient.  Reviewed patient's diet, addressing concerns and/or questions.       Work on weight loss  Regular exercise  See Patient Instructions    Return in about 2 weeks (around 7/12/2024) for Follow up.    Cierra SCANLON is a 58 year old, presenting for the following:  Physical (Nonfasting /Wants to follow-up on high BP and gout on both feet) and Health Maintenance (Colon: Never done, no Fhx - would like referral /Vaccines: Will do TDAP, Covid, and possibly Zoster )         Health Care Directive  Patient does not have a Health Care Directive or Living Will: Discussed advance care planning with patient; however, patient declined at this time.    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   BP Readings from Last 6 Encounters:   06/28/24 (!) 136/90   05/24/24 (!) 132/93   05/03/24 (!) 139/91   07/14/23 (!) 142/80   05/13/23 (!) 166/83   05/31/18 128/84      Gout: 3 gout flares in the 12 months. 2 where he couldn't walk in right foot. Had one not as bad in the left. Steroids have worked well for acute " attacks.     Health Maintenance   Colon: Due           6/25/2024   General Health   How would you rate your overall physical health? Excellent   Feel stress (tense, anxious, or unable to sleep) Not at all       Hearing    Left:  500Hz: Pass  1000Hz: Pass  2000Hz: Pass  4000Hz: Fail    Right:  500Hz: Pass  1000Hz: Pass  2000Hz: Pass  4000Hz: Fail             6/25/2024   Nutrition   Three or more servings of calcium each day? Yes   Diet: Regular (no restrictions)   How many servings of fruit and vegetables per day? (!) 0-1   How many sweetened beverages each day? 0-1            6/25/2024   Exercise   Days per week of moderate/strenous exercise 5 days   Average minutes spent exercising at this level 60 min            6/25/2024   Social Factors   Frequency of gathering with friends or relatives Twice a week   Worry food won't last until get money to buy more No   Food not last or not have enough money for food? No   Do you have housing? (Housing is defined as stable permanent housing and does not include staying ouside in a car, in a tent, in an abandoned building, in an overnight shelter, or couch-surfing.) Yes   Are you worried about losing your housing? No   Lack of transportation? No   Unable to get utilities (heat,electricity)? No            6/25/2024   Fall Risk   Fallen 2 or more times in the past year? No   Trouble with walking or balance? No             6/25/2024   Dental   Dentist two times every year? Yes            6/25/2024   TB Screening   Were you born outside of the [place]? No              Today's PHQ-2 Score:       6/28/2024     4:03 PM   PHQ-2 ( 1999 Pfizer)   Q1: Little interest or pleasure in doing things 0   Q2: Feeling down, depressed or hopeless 0   PHQ-2 Score 0         6/25/2024   Substance Use   Alcohol more than 3/day or more than 7/wk No   Do you use any other substances recreationally? (!) ALCOHOL        Social History     Tobacco Use    Smoking status: Former     Types: Cigars    Smokeless  "tobacco: Never    Tobacco comments:     ocassional   Substance Use Topics    Alcohol use: Yes     Alcohol/week: 5.8 standard drinks of alcohol     Types: 7 Cans of beer per week     Comment: socially weekends    Drug use: No           6/25/2024   STI Screening   New sexual partner(s) since last STI/HIV test? No      Last PSA: No results found for: \"PSA\"  ASCVD Risk   The ASCVD Risk score (Vinicius MICHEL, et al., 2019) failed to calculate for the following reasons:    Cannot find a previous HDL lab    Cannot find a previous total cholesterol lab         Reviewed and updated as needed this visit by Provider   Tobacco  Allergies  Meds  Problems  Med Hx  Surg Hx  Fam Hx            Past Medical History:   Diagnosis Date    Gout      Past Surgical History:   Procedure Laterality Date    NO HISTORY OF SURGERY       Lab work is in process      Review of Systems  Constitutional, HEENT, cardiovascular, pulmonary, GI, , musculoskeletal, neuro, skin, endocrine and psych systems are negative, except as otherwise noted.     Objective    Exam  BP (!) 136/90   Pulse 66   Wt 73 kg (161 lb)   SpO2 96%   BMI 28.75 kg/m     Estimated body mass index is 28.75 kg/m  as calculated from the following:    Height as of 5/24/24: 1.594 m (5' 2.75\").    Weight as of this encounter: 73 kg (161 lb).    Physical Exam  GENERAL: alert and no distress  EYES: Eyes grossly normal to inspection, PERRL and conjunctivae and sclerae normal  HENT: ear canals and TM's normal, nose and mouth without ulcers or lesions  NECK: no adenopathy, no asymmetry, masses, or scars  RESP: lungs clear to auscultation - no rales, rhonchi or wheezes  CV: regular rate and rhythm, normal S1 S2, no S3 or S4, no murmur, click or rub, no peripheral edema  ABDOMEN: soft, nontender, no hepatosplenomegaly, no masses and bowel sounds normal  MS: no gross musculoskeletal defects noted, no edema  SKIN: no suspicious lesions or rashes  NEURO: Normal strength and tone, " mentation intact and speech normal  PSYCH: mentation appears normal, affect normal/bright          Signed Electronically by: DAR Mccormick CNP

## 2024-06-28 NOTE — PATIENT INSTRUCTIONS
"How to take home blood pressure:  Sit for 5 minutes with feet flat on the floor.  Apply cuff to upper arm (bicep area) and have this arm resting at about heart level.  Take blood pressure reading.  Make sure that the machine has a memory bank that records your readings or write readings down  If you remain elevated, greater than 140/90 then please return to clinic for further high blood pressure work up.    Patient Education   Preventive Care Advice   This is general advice we often give to help people stay healthy. Your care team may have specific advice just for you. Please talk to your care team about your own preventive care needs.  Lifestyle  Exercise at least 150 minutes each week (30 minutes a day, 5 days a week).  Do muscle strengthening activities 2 days a week. These help control your weight and prevent disease.  No smoking.  Wear sunscreen to prevent skin cancer.  Have your home tested for radon every 2 to 5 years. Radon is a colorless, odorless gas that can harm your lungs. To learn more, go to www.health.[facility].[place].us and search for \"Radon in Homes.\"  Keep guns unloaded and locked up in a safe place like a safe or gun vault, or, use a gun lock and hide the keys. Always lock away bullets separately. To learn more, visit [employer].[place].gov and search for \"safe gun storage.\"  Nutrition  Eat 5 or more servings of fruits and vegetables each day.  Try wheat bread, brown rice and whole grain pasta (instead of white bread, rice, and pasta).  Get enough calcium and vitamin D. Check the label on foods and aim for 100% of the RDA (recommended daily allowance).  Regular exams  Have a dental exam and cleaning every 6 months.  See your health care team every year to talk about:  Any changes in your health.  Any medicines your care team has prescribed.  Preventive care, family planning, and ways to prevent chronic diseases.  Shots (vaccines)   HPV shots (up to age 26), if you've never had them before.  Hepatitis B shots (up to " age 59), if you've never had them before.  COVID-19 shot: Get this shot when it's due.  Flu shot: Get a flu shot every year.  Tetanus shot: Get a tetanus shot every 10 years.  Pneumococcal, hepatitis A, and RSV shots: Ask your care team if you need these based on your risk.  Shingles shot (for age 50 and up).  General health tests  Diabetes screening:  Starting at age 35, Get screened for diabetes at least every 3 years.  If you are younger than age 35, ask your care team if you should be screened for diabetes.  Cholesterol test: At age 39, start having a cholesterol test every 5 years, or more often if advised.  Bone density scan (DEXA): At age 50, ask your care team if you should have this scan for osteoporosis (brittle bones).  Hepatitis C: Get tested at least once in your life.  Abdominal aortic aneurysm screening: Talk to your doctor about having this screening if you:  Have ever smoked; and  Are biologically male; and  Are between the ages of 65 and 75.  STIs (sexually transmitted infections)  Before age 24: Ask your care team if you should be screened for STIs.  After age 24: Get screened for STIs if you're at risk. You are at risk for STIs (including HIV) if:  You are sexually active with more than one person.  You don't use condoms every time.  You or a partner was diagnosed with a sexually transmitted infection.  If you are at risk for HIV, ask about PrEP medicine to prevent HIV.  Get tested for HIV at least once in your life, whether you are at risk for HIV or not.  Cancer screening tests  Cervical cancer screening: If you have a cervix, begin getting regular cervical cancer screening tests at age 21. Most people who have regular screenings with normal results can stop after age 65. Talk about this with your provider.  Breast cancer scan (mammogram): If you've ever had breasts, begin having regular mammograms starting at age 40. This is a scan to check for breast cancer.  Colon cancer screening: It is  important to start screening for colon cancer at age 45.  Have a colonoscopy test every 10 years (or more often if you're at risk) Or, ask your provider about stool tests like a FIT test every year or Cologuard test every 3 years.  To learn more about your testing options, visit: www.[employer]/475731.pdf.  For help making a decision, visit: esthela/ev64142.  Prostate cancer screening test: If you have a prostate and are age 55 to 69, ask your provider if you would benefit from a yearly prostate cancer screening test.  Lung cancer screening: If you are a current or former smoker age 50 to 80, ask your care team if ongoing lung cancer screenings are right for you.  For informational purposes only. Not to replace the advice of your health care provider. Copyright   2023 [facility]. All rights reserved. Clinically reviewed by the [facility]. [employer] 779382 - REV 04/24.  Substance Use Disorder: Care Instructions  Overview     You can improve your life and health by stopping your use of alcohol or drugs. When you don't drink or use drugs, you may feel and sleep better. You may get along better with your family, friends, and coworkers. There are medicines and programs that can help with substance use disorder.  How can you care for yourself at home?  Here are some ways to help you stay sober and prevent relapse.  If you have been given medicine to help keep you sober or reduce your cravings, be sure to take it exactly as prescribed.  Talk to your doctor about programs that can help you stop using drugs or drinking alcohol.  Do not keep alcohol or drugs in your home.  Plan ahead. Think about what you'll say if other people ask you to drink or use drugs. Try not to spend time with people who drink or use drugs.  Use the time and money spent on drinking or drugs to do something that's important to you.  Preventing a relapse  Have a plan to deal with relapse. Learn to recognize  changes in your thinking that lead you to drink or use drugs. Get help before you start to drink or use drugs again.  Try to stay away from situations, friends, or places that may lead you to drink or use drugs.  If you feel the need to drink alcohol or use drugs again, seek help right away. Call a trusted friend or family member. Some people get support from organizations such as Narcotics Anonymous or [employer] or from treatment facilities.  If you relapse, get help as soon as you can. Some people make a plan with another person that outlines what they want that person to do for them if they relapse. The plan usually includes how to handle the relapse and who to notify in case of relapse.  Don't give up. Remember that a relapse doesn't mean that you have failed. Use the experience to learn the triggers that lead you to drink or use drugs. Then quit again. Recovery is a lifelong process. Many people have several relapses before they are able to quit for good.  Follow-up care is a key part of your treatment and safety. Be sure to make and go to all appointments, and call your doctor if you are having problems. It's also a good idea to know your test results and keep a list of the medicines you take.  When should you call for help?   Call 911  anytime you think you may need emergency care. For example, call if you or someone else:    Has overdosed or has withdrawal signs. Be sure to tell the emergency workers that you are or someone else is using or trying to quit using drugs. Overdose or withdrawal signs may include:  Losing consciousness.  Seizure.  Seeing or hearing things that aren't there (hallucinations).     Is thinking or talking about suicide or harming others.   Where to get help 24 hours a day, 7 days a week   If you or someone you know talks about suicide, self-harm, a mental health crisis, a substance use crisis, or any other kind of emotional distress, get help right away. You can:    Call the  "Suicide and Crisis Lifeline at 988.     Call 2-613-946-TALK (1-717.194.5409).     Text HOME to 572620 to access the Crisis Text Line.   Consider saving these numbers in your phone.  Go to [employer] for more information or to chat online.  Call your doctor now or seek immediate medical care if:    You are having withdrawal symptoms. These may include nausea or vomiting, sweating, shakiness, and anxiety.   Watch closely for changes in your health, and be sure to contact your doctor if:    You have a relapse.     You need more help or support to stop.   Where can you learn more?  Go to https://www.[employer].net/patiented  Enter H573 in the search box to learn more about \"Substance Use Disorder: Care Instructions.\"  Current as of: November 15, 2023               Content Version: 14.0    5218-3952 [employer].   Care instructions adapted under license by your healthcare professional. If you have questions about a medical condition or this instruction, always ask your healthcare professional. Healthwise, [employer] disclaims any warranty or liability for your use of this information.         "

## 2024-07-12 ENCOUNTER — OFFICE VISIT (OUTPATIENT)
Dept: FAMILY MEDICINE | Facility: CLINIC | Age: 59
End: 2024-07-12

## 2024-07-12 VITALS
SYSTOLIC BLOOD PRESSURE: 116 MMHG | WEIGHT: 161.4 LBS | OXYGEN SATURATION: 98 % | BODY MASS INDEX: 28.82 KG/M2 | DIASTOLIC BLOOD PRESSURE: 76 MMHG | HEART RATE: 60 BPM

## 2024-07-12 DIAGNOSIS — I10 ESSENTIAL HYPERTENSION: Primary | ICD-10-CM

## 2024-07-12 DIAGNOSIS — E78.2 MIXED HYPERLIPIDEMIA: ICD-10-CM

## 2024-07-12 DIAGNOSIS — M1A.00X0 IDIOPATHIC CHRONIC GOUT WITHOUT TOPHUS, UNSPECIFIED SITE: ICD-10-CM

## 2024-07-12 LAB
ANION GAP SERPL CALCULATED.3IONS-SCNC: 7 MMOL/L (ref 7–15)
BUN SERPL-MCNC: 16.6 MG/DL (ref 8–23)
CALCIUM SERPL-MCNC: 9.4 MG/DL (ref 8.6–10)
CHLORIDE SERPL-SCNC: 105 MMOL/L (ref 98–107)
CREAT SERPL-MCNC: 0.95 MG/DL (ref 0.67–1.17)
DEPRECATED HCO3 PLAS-SCNC: 27 MMOL/L (ref 22–29)
EGFRCR SERPLBLD CKD-EPI 2021: >90 ML/MIN/1.73M2
FASTING STATUS PATIENT QL REPORTED: NO
GLUCOSE SERPL-MCNC: 79 MG/DL (ref 70–99)
POTASSIUM SERPL-SCNC: 4 MMOL/L (ref 3.4–5.3)
SODIUM SERPL-SCNC: 139 MMOL/L (ref 135–145)

## 2024-07-12 PROCEDURE — 99214 OFFICE O/P EST MOD 30 MIN: CPT

## 2024-07-12 PROCEDURE — 80048 BASIC METABOLIC PNL TOTAL CA: CPT | Mod: ORL

## 2024-07-12 PROCEDURE — G2211 COMPLEX E/M VISIT ADD ON: HCPCS

## 2024-07-12 PROCEDURE — 36415 COLL VENOUS BLD VENIPUNCTURE: CPT

## 2024-07-12 RX ORDER — LISINOPRIL 10 MG/1
10 TABLET ORAL DAILY
Qty: 90 TABLET | Refills: 3 | Status: SHIPPED | OUTPATIENT
Start: 2024-07-12

## 2024-07-12 RX ORDER — ALLOPURINOL 200 MG/1
200 TABLET ORAL DAILY
Qty: 90 TABLET | Refills: 3 | Status: SHIPPED | OUTPATIENT
Start: 2024-07-12 | End: 2024-07-15

## 2024-07-12 NOTE — PROGRESS NOTES
Assessment & Plan     Essential hypertension  Reviewed current HTN management. Blood pressure is controlled with current medication(s). Taking Lisinopril 10 mg daily. Goal BP <140/90. We today managed prescriptions with refills ensured to ensure availabilty of current medications. Reviewed lifestyle modifications. Required intervals for follow up on HTN, lab studies reviewed. Strongly recommened blood pressures are checked outside the clinic to ensure that BPs are remaining within guidelines. Instructed to contact me if the readings are not within guidelines on a regular basis so we can adjust treatment as needed. Reviewed side effects of medications, alarm signs and symptoms, and when to seek further care. Patient agreeable to plan.   - Basic metabolic panel  - lisinopril (ZESTRIL) 10 MG tablet  Dispense: 90 tablet; Refill: 3  - VENOUS COLLECTION    Idiopathic chronic gout without tophus, unspecified site  Started Allopurinol 100 mg daily. Tolerating well. Reviewed uric acid results. Will increase to 200 mg daily. Education about adverse effects of prescription medication discussed. Red flags that warrant emergent evaluation discussed. Follow up reviewed. Patient agreeable to plan. All questions answered.  - allopurinol 200 MG TABS  Dispense: 90 tablet; Refill: 3    Mixed hyperlipidemia  Reviewed current lipid results, previous results (if available) current guidelines (NCEP) for treatment and goals for lipids. Discussed ongoing lifestyle modification. Reviewed medication use for lipid lowering including possible side effects. Patient declines medication today and elects to complete CT coronary calcium scan. Reviewed the scan can be an out of pocket cost roughly ($100) to assess your arteries around your heart. CT scan ordered. Follow up reviewed. Patient agreeable to plan. All questions answered.   - CT Coronary Calcium Scan            Work on weight loss  Regular exercise  See Patient Instructions    Return if  symptoms worsen or fail to improve, for Follow up.    Cierra SCANLON is a 58 year old, presenting for the following health issues:  Hypertension (Follow-up/Going well - no concerns /Has not taken his lisinopril today yet )    History of Present Illness       Hypertension: He presents for follow up of hypertension.  He does check blood pressure  regularly outside of the clinic. Outside blood pressures have been over 140/90. He does not follow a low salt diet.     He eats 0-1 servings of fruits and vegetables daily.He consumes 1 sweetened beverage(s) daily.He exercises with enough effort to increase his heart rate 60 or more minutes per day.  He exercises with enough effort to increase his heart rate 6 days per week.   He is taking medications regularly.       HTN: Taking Lisinopril 10 mg daily. BP at home in the 120's/80's. Has been taking it in the evening. One missed dose on the 4th of July. No reported side effects.     HLD: reviewed cholesterol results. Would like to do CT coronary calcium scan.     The 10-year ASCVD risk score (Vinicius MICHEL, et al., 2019) is: 7.7%    Values used to calculate the score:      Age: 58 years      Sex: Male      Is Non- : No      Diabetic: No      Tobacco smoker: No      Systolic Blood Pressure: 116 mmHg      Is BP treated: Yes      HDL Cholesterol: 67 mg/dL      Total Cholesterol: 247 mg/dL    Gout: tolerating medication well.       Review of Systems  Constitutional, HEENT, cardiovascular, pulmonary, GI, , musculoskeletal, neuro, skin, endocrine and psych systems are negative, except as otherwise noted.      Objective    /76   Pulse 60   Wt 73.2 kg (161 lb 6.4 oz)   SpO2 98%   BMI 28.82 kg/m    Body mass index is 28.82 kg/m .  Physical Exam   GENERAL: alert and no distress  RESP: lungs clear to auscultation - no rales, rhonchi or wheezes  CV: regular rate and rhythm, normal S1 S2, no S3 or S4, no murmur, click or rub, no peripheral edema  MS: no  gross musculoskeletal defects noted, no edema  PSYCH: mentation appears normal, affect normal/bright    No results found for this or any previous visit (from the past 24 hour(s)).        Signed Electronically by: DAR Mccormick CNP

## 2024-07-15 RX ORDER — ALLOPURINOL 100 MG/1
200 TABLET ORAL DAILY
Qty: 180 TABLET | Refills: 3 | Status: SHIPPED | OUTPATIENT
Start: 2024-07-15

## 2024-08-21 ENCOUNTER — HOSPITAL ENCOUNTER (OUTPATIENT)
Dept: CARDIOLOGY | Facility: CLINIC | Age: 59
Discharge: HOME | End: 2024-08-21
Payer: COMMERCIAL

## 2024-08-21 DIAGNOSIS — E78.2 MIXED HYPERLIPIDEMIA: ICD-10-CM

## 2024-08-21 PROCEDURE — 75571 CT HRT W/O DYE W/CA TEST: CPT

## 2024-08-21 PROCEDURE — 75571 CT HRT W/O DYE W/CA TEST: CPT | Mod: 26 | Performed by: INTERNAL MEDICINE

## 2025-05-31 ENCOUNTER — TELEPHONE (OUTPATIENT)
Dept: URGENT CARE | Facility: URGENT CARE | Age: 60
End: 2025-05-31

## 2025-05-31 ENCOUNTER — OFFICE VISIT (OUTPATIENT)
Dept: URGENT CARE | Facility: URGENT CARE | Age: 60
End: 2025-05-31
Payer: COMMERCIAL

## 2025-05-31 VITALS
WEIGHT: 158.1 LBS | BODY MASS INDEX: 29.09 KG/M2 | SYSTOLIC BLOOD PRESSURE: 138 MMHG | TEMPERATURE: 98.7 F | DIASTOLIC BLOOD PRESSURE: 88 MMHG | OXYGEN SATURATION: 97 % | HEIGHT: 62 IN | RESPIRATION RATE: 19 BRPM | HEART RATE: 63 BPM

## 2025-05-31 DIAGNOSIS — M10.9 EXACERBATION OF GOUT: Primary | ICD-10-CM

## 2025-05-31 DIAGNOSIS — M10.9 EXACERBATION OF GOUT: ICD-10-CM

## 2025-05-31 PROCEDURE — 3079F DIAST BP 80-89 MM HG: CPT | Performed by: NURSE PRACTITIONER

## 2025-05-31 PROCEDURE — 3075F SYST BP GE 130 - 139MM HG: CPT | Performed by: NURSE PRACTITIONER

## 2025-05-31 PROCEDURE — 99214 OFFICE O/P EST MOD 30 MIN: CPT | Performed by: NURSE PRACTITIONER

## 2025-05-31 RX ORDER — METHYLPREDNISOLONE 4 MG/1
TABLET ORAL
Qty: 21 TABLET | Refills: 0 | Status: SHIPPED | OUTPATIENT
Start: 2025-05-31 | End: 2025-05-31

## 2025-05-31 RX ORDER — METHYLPREDNISOLONE 4 MG/1
TABLET ORAL
Qty: 21 TABLET | Refills: 0 | Status: SHIPPED | OUTPATIENT
Start: 2025-05-31

## 2025-05-31 NOTE — PROGRESS NOTES
Urgent Care Clinic Visit    Chief Complaint   Patient presents with    Arthritis     Possible gout flare up of the left.                5/31/2025    10:36 AM   Additional Questions   Roomed by Anjum James MA   Accompanied by Self

## 2025-05-31 NOTE — PROGRESS NOTES
"Chief Complaint   Patient presents with    Arthritis     Possible gout flare up of the left foot           ICD-10-CM    1. Exacerbation of gout  M10.9 methylPREDNISolone (MEDROL DOSEPAK) 4 MG tablet therapy pack      Patient with long history of chronic gout responds well to Medrol Dosepak.  Rx sent to pharmacy.  He will continue his allopurinol.  Follow-up with primary care as needed.    Red flag warning signs and when to go to the emergency room discussed.  Reviewed potential adverse reactions to medications.    Subjective     Kelvin Guevara is an 59 year old male who presents to clinic today for pain in the top of left foot over the metatarsals with redness.  He does have a history of gout and takes allopurinol every day.  He denies fever, chills, trauma.    Objective    /88 (BP Location: Right arm, Patient Position: Sitting, Cuff Size: Adult Regular)   Pulse 63   Temp 98.7  F (37.1  C) (Oral)   Resp 19   Ht 1.575 m (5' 2\")   Wt 71.7 kg (158 lb 1.6 oz)   SpO2 97%   BMI 28.92 kg/m    Nurses notes and VS have been reviewed.    Physical Exam       GENERAL APPEARANCE: alert and mild distress     MS: extremities normal- no gross deformities noted; normal muscle tone, except for top of left foot which is red, hot and mildly swollen     SKIN: no suspicious lesions or rashes     NEURO: Normal strength and tone, mentation intact and speech normal, normal sensation to feet      DAR Greco, CNP  [facility]    The use of Dragon/[employer] dictation services may have been used to construct the content in this note; any grammatical or spelling errors are non-intentional. Please contact the author of this note directly if you are in need of any clarification.     "

## 2025-05-31 NOTE — TELEPHONE ENCOUNTER
Pt notified medication sent to [facility] on [address]. Pt voiced understanding.    Candido Morales CMA on 5/31/2025 at 12:08 PM

## 2025-05-31 NOTE — TELEPHONE ENCOUNTER
Medication Question or Refill    Contacts       Contact Date/Time Type Contact Phone/Fax    05/31/2025 11:54 AM CDT Phone (Incoming) GHISLAINE Guevara (Self) [phone] (H)            What medication are you calling about (include dose and sig)?:   methylPREDNISolone (MEDROL DOSEPAK) 4 MG tablet therapy pack     Preferred Pharmacy:   [address] [address] [phone]      Controlled Substance Agreement on file:   CSA -- Patient Level:    CSA: None found at the patient level.       Who prescribed the medication?: Cami Rich    Do you need a refill? No, med was originally sent to another pharmacy that is closed. Pt needs rerouted.     When did you use the medication last? -    Patient offered an appointment? No    Do you have any questions or concerns?  No    Could we send this information to you in [facility] or would you prefer to receive a phone call?:   Patient would prefer a phone call   Okay to leave a detailed message?: Yes at Home number on file [phone] (home)

## 2025-08-17 ENCOUNTER — HEALTH MAINTENANCE LETTER (OUTPATIENT)
Age: 60
End: 2025-08-17

## 2025-08-26 DIAGNOSIS — I10 ESSENTIAL HYPERTENSION: ICD-10-CM

## 2025-08-27 RX ORDER — LISINOPRIL 10 MG/1
10 TABLET ORAL DAILY
Qty: 30 TABLET | Refills: 0 | Status: SHIPPED | OUTPATIENT
Start: 2025-08-27